# Patient Record
Sex: FEMALE | Race: WHITE | NOT HISPANIC OR LATINO | URBAN - METROPOLITAN AREA
[De-identification: names, ages, dates, MRNs, and addresses within clinical notes are randomized per-mention and may not be internally consistent; named-entity substitution may affect disease eponyms.]

---

## 2017-10-18 ENCOUNTER — APPOINTMENT (OUTPATIENT)
Dept: WOMENS IMAGING | Facility: HOSPITAL | Age: 47
End: 2017-10-18

## 2017-10-18 PROCEDURE — 77067 SCR MAMMO BI INCL CAD: CPT | Performed by: RADIOLOGY

## 2017-10-18 PROCEDURE — MDREVIEWSP: Performed by: RADIOLOGY

## 2017-10-18 PROCEDURE — 77063 BREAST TOMOSYNTHESIS BI: CPT | Performed by: RADIOLOGY

## 2017-10-18 PROCEDURE — G0202 SCR MAMMO BI INCL CAD: HCPCS | Performed by: RADIOLOGY

## 2019-02-13 ENCOUNTER — APPOINTMENT (OUTPATIENT)
Dept: WOMENS IMAGING | Facility: HOSPITAL | Age: 49
End: 2019-02-13

## 2019-02-13 PROCEDURE — MDREVIEWSP: Performed by: RADIOLOGY

## 2019-02-13 PROCEDURE — 77063 BREAST TOMOSYNTHESIS BI: CPT | Performed by: RADIOLOGY

## 2019-02-13 PROCEDURE — 77067 SCR MAMMO BI INCL CAD: CPT | Performed by: RADIOLOGY

## 2020-11-30 ENCOUNTER — APPOINTMENT (OUTPATIENT)
Dept: WOMENS IMAGING | Facility: HOSPITAL | Age: 50
End: 2020-11-30

## 2020-11-30 PROCEDURE — 77067 SCR MAMMO BI INCL CAD: CPT | Performed by: RADIOLOGY

## 2020-11-30 PROCEDURE — 77063 BREAST TOMOSYNTHESIS BI: CPT | Performed by: RADIOLOGY

## 2022-05-11 ENCOUNTER — APPOINTMENT (OUTPATIENT)
Dept: WOMENS IMAGING | Facility: HOSPITAL | Age: 52
End: 2022-05-11

## 2022-05-11 PROCEDURE — 77067 SCR MAMMO BI INCL CAD: CPT | Performed by: RADIOLOGY

## 2022-05-11 PROCEDURE — 77063 BREAST TOMOSYNTHESIS BI: CPT | Performed by: RADIOLOGY

## 2023-05-12 ENCOUNTER — APPOINTMENT (OUTPATIENT)
Dept: WOMENS IMAGING | Facility: HOSPITAL | Age: 53
End: 2023-05-12
Payer: COMMERCIAL

## 2023-05-12 PROCEDURE — 77067 SCR MAMMO BI INCL CAD: CPT | Performed by: RADIOLOGY

## 2023-05-12 PROCEDURE — 77063 BREAST TOMOSYNTHESIS BI: CPT | Performed by: RADIOLOGY

## 2023-10-19 ENCOUNTER — TELEPHONE (OUTPATIENT)
Dept: SURGERY | Facility: CLINIC | Age: 53
End: 2023-10-19
Payer: COMMERCIAL

## 2023-10-19 DIAGNOSIS — Z12.11 ENCOUNTER FOR SCREENING COLONOSCOPY: Primary | ICD-10-CM

## 2023-12-12 RX ORDER — ROSUVASTATIN CALCIUM 5 MG/1
5 TABLET, COATED ORAL EVERY OTHER DAY
COMMUNITY

## 2023-12-13 ENCOUNTER — ANESTHESIA (OUTPATIENT)
Dept: GASTROENTEROLOGY | Facility: HOSPITAL | Age: 53
End: 2023-12-13
Payer: COMMERCIAL

## 2023-12-13 ENCOUNTER — HOSPITAL ENCOUNTER (OUTPATIENT)
Facility: HOSPITAL | Age: 53
Setting detail: HOSPITAL OUTPATIENT SURGERY
Discharge: HOME | End: 2023-12-13
Attending: STUDENT IN AN ORGANIZED HEALTH CARE EDUCATION/TRAINING PROGRAM | Admitting: STUDENT IN AN ORGANIZED HEALTH CARE EDUCATION/TRAINING PROGRAM
Payer: COMMERCIAL

## 2023-12-13 ENCOUNTER — ANESTHESIA EVENT (OUTPATIENT)
Dept: GASTROENTEROLOGY | Facility: HOSPITAL | Age: 53
End: 2023-12-13
Payer: COMMERCIAL

## 2023-12-13 VITALS
RESPIRATION RATE: 16 BRPM | SYSTOLIC BLOOD PRESSURE: 139 MMHG | HEART RATE: 59 BPM | BODY MASS INDEX: 26.65 KG/M2 | WEIGHT: 156.1 LBS | OXYGEN SATURATION: 100 % | HEIGHT: 64 IN | DIASTOLIC BLOOD PRESSURE: 83 MMHG

## 2023-12-13 DIAGNOSIS — Z12.11 ENCOUNTER FOR SCREENING COLONOSCOPY: ICD-10-CM

## 2023-12-13 PROCEDURE — 25010000002 PROPOFOL 10 MG/ML EMULSION: Performed by: ANESTHESIOLOGY

## 2023-12-13 PROCEDURE — 25810000003 LACTATED RINGERS PER 1000 ML: Performed by: STUDENT IN AN ORGANIZED HEALTH CARE EDUCATION/TRAINING PROGRAM

## 2023-12-13 RX ORDER — PROPOFOL 10 MG/ML
VIAL (ML) INTRAVENOUS AS NEEDED
Status: DISCONTINUED | OUTPATIENT
Start: 2023-12-13 | End: 2023-12-13 | Stop reason: SURG

## 2023-12-13 RX ORDER — SODIUM CHLORIDE, SODIUM LACTATE, POTASSIUM CHLORIDE, CALCIUM CHLORIDE 600; 310; 30; 20 MG/100ML; MG/100ML; MG/100ML; MG/100ML
1000 INJECTION, SOLUTION INTRAVENOUS CONTINUOUS
Status: DISCONTINUED | OUTPATIENT
Start: 2023-12-13 | End: 2023-12-13 | Stop reason: HOSPADM

## 2023-12-13 RX ORDER — LIDOCAINE HYDROCHLORIDE 20 MG/ML
INJECTION, SOLUTION INFILTRATION; PERINEURAL AS NEEDED
Status: DISCONTINUED | OUTPATIENT
Start: 2023-12-13 | End: 2023-12-13 | Stop reason: SURG

## 2023-12-13 RX ORDER — SODIUM CHLORIDE 0.9 % (FLUSH) 0.9 %
10 SYRINGE (ML) INJECTION AS NEEDED
Status: DISCONTINUED | OUTPATIENT
Start: 2023-12-13 | End: 2023-12-13 | Stop reason: HOSPADM

## 2023-12-13 RX ORDER — LIDOCAINE HYDROCHLORIDE 10 MG/ML
0.5 INJECTION, SOLUTION INFILTRATION; PERINEURAL ONCE AS NEEDED
Status: DISCONTINUED | OUTPATIENT
Start: 2023-12-13 | End: 2023-12-13 | Stop reason: HOSPADM

## 2023-12-13 RX ADMIN — PROPOFOL 200 MCG/KG/MIN: 10 INJECTION, EMULSION INTRAVENOUS at 12:01

## 2023-12-13 RX ADMIN — LIDOCAINE HYDROCHLORIDE 60 MG: 20 INJECTION, SOLUTION INFILTRATION; PERINEURAL at 12:01

## 2023-12-13 RX ADMIN — PROPOFOL 20 MG: 10 INJECTION, EMULSION INTRAVENOUS at 12:27

## 2023-12-13 RX ADMIN — PROPOFOL 50 MG: 10 INJECTION, EMULSION INTRAVENOUS at 12:06

## 2023-12-13 RX ADMIN — SODIUM CHLORIDE, POTASSIUM CHLORIDE, SODIUM LACTATE AND CALCIUM CHLORIDE 1000 ML: 600; 310; 30; 20 INJECTION, SOLUTION INTRAVENOUS at 11:40

## 2023-12-13 RX ADMIN — PROPOFOL 150 MG: 10 INJECTION, EMULSION INTRAVENOUS at 12:01

## 2023-12-13 NOTE — DISCHARGE INSTRUCTIONS
For the next 24 hours patient needs to be with a responsible adult.    For 24 hours DO NOT drive, operate machinery, appliances, drink alcohol, make important decisions or sign legal documents.    Start with a light or bland diet if you are feeling sick to your stomach otherwise advance to regular diet as tolerated.    Follow recommendations on procedure report if provided by your doctor.    Call Dr Sebastian for problems [phone]    Problems may include but not limited to: large amounts of bleeding, trouble breathing, repeated vomiting, severe unrelieved pain, fever or chills.

## 2023-12-13 NOTE — ANESTHESIA POSTPROCEDURE EVALUATION
Patient: Rafia Baker    Procedure Summary       Date: 12/13/23 Room / Location:  [facility]    Anesthesia Start: 1156 Anesthesia Stop: 1234    Procedure: COLONOSCOPY to cecum into terminal ileum Diagnosis:       Encounter for screening colonoscopy      (Encounter for screening colonoscopy [Z12.11])    Surgeons: Krupa Sebastian MD Provider: Heri Scott MD    Anesthesia Type: MAC ASA Status: 1            Anesthesia Type: MAC    Vitals  Vitals Value Taken Time   /83 12/13/23 1300   Temp     Pulse 56 12/13/23 1304   Resp 16 12/13/23 1300   SpO2 97 % 12/13/23 1304   Vitals shown include unfiled device data.        Post Anesthesia Care and Evaluation    Patient location during evaluation: bedside  Pain management: adequate    Airway patency: patent  Anesthetic complications: No anesthetic complications    Cardiovascular status: acceptable  Respiratory status: acceptable  Hydration status: acceptable

## 2023-12-13 NOTE — H&P
GENERAL SURGERY HISTORY AND PHYSICAL     CHIEF COMPLAINT:    Screening colonoscopy    HPI:    Rafia Baker is a 53 y.o. lady here for screening colonoscopy. She has never had a colonoscopy before. She states she has done a cologuard test in the past that was negative. She denies any family history of colorectal cancer.     ALLERGIES:   Allergies   Allergen Reactions    Penicillins Swelling     AS A CHILD ONLY     MEDICATIONS:    Reviewed in Epic   Rosuvastatin    PHYSICAL EXAM:   Constitutional: Well-developed well-nourished, no acute distress  Eyes: Conjunctiva normal, sclera nonicteric  ENMT: Hearing grossly normal, oral mucosa moist  Neck: Supple, trachea midline  Respiratory: Normal inspiratory effort  Cardiovascular: Regular rate, no peripheral edema, no jugular venous distention  Gastrointestinal: Abd soft, nondistended  Skin:  Warm, dry, no rash on visualized skin surfaces  Musculoskeletal: Symmetric strength  Psychiatric: Alert and oriented ×3, normal affect   BMI is >= 25 and <30. (Overweight) The following options were offered after discussion;: information on healthy weight added to patient's after visit summary       ASSESSMENT/PLAN:  Rafia Baker is a 53 y.o. lady here for screening colonoscopy.     I discussed recommendation for colonoscopy.  I obtained informed consent, discussing with the patient the benefits, risks of the procedure (including but not limited to: unexpected bleeding possibly requiring hospitalization and/or an unplanned repeat colonoscopy, perforation possibly requiring surgical treatment, missed lesions, complications of sedation/anesthesia), alternatives, and postprocedure expectations.  I provided opportunity for the patient to ask questions and answered them to the best of my ability.  The patient wishes to proceed with colonoscopy.    rKupa Sebastian MD  General, Robotic and Endoscopic Surgery  [facility]    [address], [address]  [place], [place], [address]  P:  [phone]  F: [phone]

## 2023-12-13 NOTE — ANESTHESIA PREPROCEDURE EVALUATION
Anesthesia Evaluation     Patient summary reviewed and Nursing notes reviewed   history of anesthetic complications:  PONV prolonged sedation  NPO Solid Status: > 8 hours  NPO Liquid Status: > 4 hours           Airway   Mallampati: II  TM distance: >3 FB  Neck ROM: full  No difficulty expected  Dental - normal exam     Pulmonary - normal exam    breath sounds clear to auscultation  Cardiovascular - normal exam    Rhythm: regular  Rate: normal    (+) hyperlipidemia      Neuro/Psych  GI/Hepatic/Renal/Endo      Musculoskeletal     Abdominal  - normal exam   Substance History      OB/GYN          Other                      Anesthesia Plan    ASA 1     MAC     intravenous induction     Anesthetic plan, risks, benefits, and alternatives have been provided, discussed and informed consent has been obtained with: patient.      CODE STATUS:

## 2024-05-10 ENCOUNTER — OFFICE VISIT (OUTPATIENT)
Dept: INTERNAL MEDICINE | Facility: CLINIC | Age: 54
End: 2024-05-10
Payer: COMMERCIAL

## 2024-05-10 VITALS
OXYGEN SATURATION: 97 % | DIASTOLIC BLOOD PRESSURE: 84 MMHG | HEIGHT: 64 IN | TEMPERATURE: 97.7 F | WEIGHT: 148 LBS | BODY MASS INDEX: 25.27 KG/M2 | SYSTOLIC BLOOD PRESSURE: 124 MMHG | HEART RATE: 70 BPM

## 2024-05-10 DIAGNOSIS — M54.12 CERVICAL RADICULOPATHY: ICD-10-CM

## 2024-05-10 DIAGNOSIS — Z82.49 FAMILY HISTORY OF EARLY CAD: ICD-10-CM

## 2024-05-10 DIAGNOSIS — E78.5 HYPERLIPIDEMIA, UNSPECIFIED HYPERLIPIDEMIA TYPE: Primary | ICD-10-CM

## 2024-05-10 PROBLEM — I25.10 ATHEROSCLEROSIS OF CORONARY ARTERY WITHOUT ANGINA PECTORIS: Status: ACTIVE | Noted: 2023-12-26

## 2024-05-10 PROCEDURE — 99204 OFFICE O/P NEW MOD 45 MIN: CPT | Performed by: STUDENT IN AN ORGANIZED HEALTH CARE EDUCATION/TRAINING PROGRAM

## 2024-05-10 RX ORDER — CHOLECALCIFEROL (VITAMIN D3) 125 MCG
5 CAPSULE ORAL
COMMUNITY

## 2024-05-10 RX ORDER — ERGOCALCIFEROL 1.25 MG/1
50000 CAPSULE ORAL WEEKLY
COMMUNITY

## 2024-05-10 RX ORDER — MAGNESIUM GLUCONATE 27 MG(500)
27 TABLET ORAL 2 TIMES DAILY
COMMUNITY

## 2024-05-14 ENCOUNTER — APPOINTMENT (OUTPATIENT)
Dept: WOMENS IMAGING | Facility: HOSPITAL | Age: 54
End: 2024-05-14
Payer: COMMERCIAL

## 2024-05-14 PROCEDURE — 77063 BREAST TOMOSYNTHESIS BI: CPT | Performed by: RADIOLOGY

## 2024-05-14 PROCEDURE — 77067 SCR MAMMO BI INCL CAD: CPT | Performed by: RADIOLOGY

## 2024-09-03 ENCOUNTER — OFFICE VISIT (OUTPATIENT)
Dept: INTERNAL MEDICINE | Facility: CLINIC | Age: 54
End: 2024-09-03
Payer: COMMERCIAL

## 2024-09-03 VITALS
OXYGEN SATURATION: 98 % | DIASTOLIC BLOOD PRESSURE: 68 MMHG | WEIGHT: 146.8 LBS | SYSTOLIC BLOOD PRESSURE: 124 MMHG | BODY MASS INDEX: 25.06 KG/M2 | TEMPERATURE: 97.6 F | HEIGHT: 64 IN | HEART RATE: 67 BPM

## 2024-09-03 DIAGNOSIS — Z82.49 FAMILY HISTORY OF EARLY CAD: ICD-10-CM

## 2024-09-03 DIAGNOSIS — E78.5 HYPERLIPIDEMIA, UNSPECIFIED HYPERLIPIDEMIA TYPE: Primary | ICD-10-CM

## 2024-09-03 DIAGNOSIS — M72.2 PLANTAR FASCIITIS: ICD-10-CM

## 2024-09-03 DIAGNOSIS — R03.0 ELEVATED BLOOD PRESSURE READING WITHOUT DIAGNOSIS OF HYPERTENSION: ICD-10-CM

## 2024-09-03 DIAGNOSIS — I25.10 ATHEROSCLEROSIS OF NATIVE CORONARY ARTERY OF NATIVE HEART WITHOUT ANGINA PECTORIS: ICD-10-CM

## 2024-09-03 PROCEDURE — 99214 OFFICE O/P EST MOD 30 MIN: CPT | Performed by: STUDENT IN AN ORGANIZED HEALTH CARE EDUCATION/TRAINING PROGRAM

## 2024-09-03 RX ORDER — POLYMYXIN B SULFATE AND TRIMETHOPRIM 1; 10000 MG/ML; [USP'U]/ML
1 SOLUTION OPHTHALMIC EVERY 4 HOURS
COMMUNITY
Start: 2024-08-13 | End: 2024-09-05 | Stop reason: ALTCHOICE

## 2024-09-03 NOTE — PROGRESS NOTES
"Chief Complaint  Hyperlipidemia    Subjective        Rafia Baker presents to [facility]  History of Present Illness    Presents to clinic today for follow up    Labs obtained prior to this visit demonstrated non-HDL cholesterol is largely unchanged from February lipid panel.  She is currently taking Crestor and has researched other pharmacological options for her hyperlipidemia.  She does express concern regarding her biomarkers of ApoB and LPA and risk factors for cardiovascular disease.  She also reports of some pain in her left plantar heel that she attributes to her statin as symptom onset was initiation of statin    Objective   Vital Signs:  /68   Pulse 67   Temp 97.6 °F (36.4 °C) (Temporal)   Ht 162.6 cm (64\")   Wt 66.6 kg (146 lb 12.8 oz)   SpO2 98%   BMI 25.20 kg/m²   Estimated body mass index is 25.2 kg/m² as calculated from the following:    Height as of this encounter: 162.6 cm (64\").    Weight as of this encounter: 66.6 kg (146 lb 12.8 oz).            Physical Exam  Vitals reviewed.   Constitutional:       General: She is not in acute distress.     Appearance: Normal appearance. She is not toxic-appearing.   HENT:      Head: Normocephalic and atraumatic.   Eyes:      General: No scleral icterus.     Conjunctiva/sclera: Conjunctivae normal.   Musculoskeletal:      Left foot: Tenderness present.      Comments: Tenderness at left plantar insertion of left heel   Skin:     General: Skin is warm and dry.   Neurological:      Mental Status: She is alert and oriented to person, place, and time.   Psychiatric:         Mood and Affect: Mood normal.         Behavior: Behavior normal.        Result Review :                Assessment and Plan   Diagnoses and all orders for this visit:    1. Hyperlipidemia, unspecified hyperlipidemia type (Primary)    2. Plantar fasciitis  -     Ambulatory Referral to Podiatry    3. Family history of early CAD    4. Atherosclerosis of " native coronary artery of native heart without angina pectoris    5. Elevated blood pressure reading without diagnosis of hypertension      HLD is chronic, and largely unchanged from Feb 2024 lipid panel despite crestor. She is extremely concerned regarding her ASCVD risk given her apoB and Lp(a) biomarkers however I encouraged her that these do not necessarily change our management of HLD and lifestyle modifications in addition to her current medication is best way to prevent potential future ASCVD events. She has upcoming appt with cardiology to discuss further, so we will hold on adjusting or changing any medications however I did inform her that I do not think her plantar fasciitis is related at all and that this was likely coincidental in onset. I will refer her to podiatry and provided information regarding stretching of plantar fasciitis.     Her initial BP reading was at goal however repeat was slightly elevated at 144/88. She will come back in about 1mo to recheck this and in meantime she will get BP cuff at home to monitor.            I spent 31 minutes caring for Rafia on this date of service. This time includes time spent by me in the following activities:preparing for the visit, reviewing tests, obtaining and/or reviewing a separately obtained history, performing a medically appropriate examination and/or evaluation , ordering medications, tests, or procedures, referring and communicating with other health care professionals , and documenting information in the medical record  Follow Up   Return in about 3 months (around 12/3/2024) for Annual physical.  Patient was given instructions and counseling regarding her condition or for health maintenance advice. Please see specific information pulled into the AVS if appropriate.

## 2024-09-05 ENCOUNTER — OFFICE VISIT (OUTPATIENT)
Dept: CARDIOLOGY | Facility: CLINIC | Age: 54
End: 2024-09-05
Payer: COMMERCIAL

## 2024-09-05 VITALS
BODY MASS INDEX: 25.27 KG/M2 | HEIGHT: 64 IN | DIASTOLIC BLOOD PRESSURE: 88 MMHG | HEART RATE: 66 BPM | WEIGHT: 148 LBS | SYSTOLIC BLOOD PRESSURE: 148 MMHG

## 2024-09-05 DIAGNOSIS — E78.41 ELEVATED LIPOPROTEIN(A): Primary | ICD-10-CM

## 2024-09-05 DIAGNOSIS — Z82.49 FAMILY HISTORY OF EARLY CAD: ICD-10-CM

## 2024-09-05 PROCEDURE — 93000 ELECTROCARDIOGRAM COMPLETE: CPT | Performed by: INTERNAL MEDICINE

## 2024-09-05 PROCEDURE — 99204 OFFICE O/P NEW MOD 45 MIN: CPT | Performed by: INTERNAL MEDICINE

## 2024-09-05 RX ORDER — ROSUVASTATIN CALCIUM 10 MG/1
10 TABLET, COATED ORAL NIGHTLY
Qty: 90 TABLET | Refills: 3 | Status: SHIPPED | COMMUNITY
Start: 2024-09-05

## 2024-09-05 NOTE — PROGRESS NOTES
Date of Office Visit: 2024  Encounter Provider: Stephani Fountain MD  Place of Service: [facility] CARDIOLOGY  Patient Name: Rafia Baker  :1970      Patient ID:  Rafia Baker is a 54 y.o. female is here for hyperlipidemia, mild CAD.          History of Present Illness    She has a history of mild CAD, chronic venous insufficiency and hyperlipidemia.    Her mom  at 63 of myocardial infarction.  Her dad had hypertension and  in his sleep at age 80.  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  She is from [facility].    Stress echocardiogram done 2019 was nonischemic with normal left ventricular systolic function no significant valvular abnormalities.  Labs done 2024 showed total cholesterol 191, triglycerides 92, HDL 64, VLDL 17, , normal CMP.    Labs on 2024 showed LP(a) elevated at 332.7, , HDL 66, total cholesterol 192, triglycerides 109, apolipoprotein B 100, VLDL 19.  CTA angiogram heart done 2023 at [facility] showed a total calcium score 3.2 in the LAD, 30% mid LAD stenosis, 20% proximal mLAD stenosis, 30% mid circumflex stenosis, mild nonobstructive coronary disease-medical management recommended.    She has plantars fasciitis and so is not exercising right now.  She has switched to mostly a vegan diet but does use some protein.  She has had some mild muscle aching but she is not sure if it is at all related to rosuvastatin.  She has no heart racing or skipping.  She has had no dizziness, syncope or falls.    Past Medical History:   Diagnosis Date    Anesthesia     STATES SLOW TO WAKE UP AFTER SURGERY    Hyperlipidemia     PONV (postoperative nausea and vomiting)          Past Surgical History:   Procedure Laterality Date    COLONOSCOPY N/A 2023    Procedure: COLONOSCOPY to cecum  "into terminal ileum;  Surgeon: Krupa Sebastian MD;  Location: [facility] ENDOSCOPY;  Service: General;  Laterality: N/A;  pre: screening  post: normal    ENDOVENOUS ABLATION SAPHENOUS VEIN W/ LASER      HYSTEROSCOPY W/ POLYPECTOMY      OTHER SURGICAL HISTORY      IVF       Current Outpatient Medications on File Prior to Visit   Medication Sig Dispense Refill    Ascorbic Acid (VITAMIN C PO) Take  by mouth.      magnesium gluconate (MAGONATE) 500 MG tablet Take 27 mg by mouth Daily.      rosuvastatin (CRESTOR) 5 MG tablet Take 1 tablet by mouth Daily.      vitamin D (ERGOCALCIFEROL) 1.25 MG (33795 UT) capsule capsule Take 1 capsule by mouth 1 (One) Time Per Week.      trimethoprim-polymyxin b (POLYTRIM) 59228-4.1 UNIT/ML-% ophthalmic solution Apply 1 drop to eye(s) as directed by provider Every 4 (Four) Hours. (Patient not taking: Reported on 9/5/2024)       No current facility-administered medications on file prior to visit.       Social History     Socioeconomic History    Marital status:     Number of children: 1   Tobacco Use    Smoking status: Never     Passive exposure: Never    Smokeless tobacco: Never    Tobacco comments:     Caffeine: 2 coffee daily   Vaping Use    Vaping status: Never Used   Substance and Sexual Activity    Alcohol use: Not Currently     Comment: 1-2 times yearly    Drug use: Never    Sexual activity: Defer             Procedures    ECG 12 Lead    Date/Time: 9/5/2024 11:46 AM  Performed by: Stephani Fountain MD    Authorized by: Stephani Fountain MD  Comparison: not compared with previous ECG   Previous ECG: no previous ECG available  Rhythm: sinus rhythm  Other findings: non-specific ST-T wave changes    Clinical impression: non-specific ECG              Objective:      Vitals:    09/05/24 1129   BP: 148/88   Pulse: 66   Weight: 67.1 kg (148 lb)   Height: 162.6 cm (64\")     Body mass index is 25.4 kg/m².    Vitals reviewed.   Constitutional:       General: Not in acute " distress.     Appearance: Not diaphoretic.   Neck:      Vascular: No carotid bruit or JVD.   Pulmonary:      Effort: Pulmonary effort is normal.      Breath sounds: Normal breath sounds.   Cardiovascular:      Normal rate. Regular rhythm.      Murmurs: There is no murmur.      No gallop.  No rub.   Pulses:     Intact distal pulses.      Carotid: 2+ bilaterally.     Radial: 2+ bilaterally.     Dorsalis pedis: 2+ bilaterally.     Posterior tibial: 2+ bilaterally.  Edema:     Peripheral edema absent.   Neurological:      Cranial Nerves: No cranial nerve deficit.         Lab Review:       Assessment:      Diagnosis Plan   1. Elevated lipoprotein(a)  Lipid Panel      2. Family history of early CAD  Lipid Panel        Elevated LP(a) and apolipoprotein B.  Increases to statin 10 mg nightly, watch for muscle aching.  She will let me know if this is not tolerable.  Mild nonobstructive CAD, no angina  Family history of CAD  Elevated blood pressure reading here today, at home her blood pressure runs 115-120/70     Plan:       Increase rosuvastatin 10 mg daily.  Consider PCSK9 if she cannot tolerate the rosuvastatin.  Advised exercise, hydration and healthy eating, see Terra in 3 months to see how she is tolerating the medication.  Repeat lipids in 6 weeks

## 2024-09-24 ENCOUNTER — TELEPHONE (OUTPATIENT)
Dept: CARDIOLOGY | Facility: CLINIC | Age: 54
End: 2024-09-24
Payer: COMMERCIAL

## 2024-11-04 RX ORDER — ROSUVASTATIN CALCIUM 5 MG/1
5 TABLET, COATED ORAL DAILY
Qty: 90 TABLET | Refills: 3 | Status: SHIPPED | OUTPATIENT
Start: 2024-11-04

## 2024-11-04 NOTE — TELEPHONE ENCOUNTER
Failed protocol    NOV-12/11/24-RM  LOV-09/05/24-RM    Plan:       Increase rosuvastatin 10 mg daily.  Consider PCSK9 if she cannot tolerate the rosuvastatin.  Advised exercise, hydration and healthy eating, see [facility] in 3 months to see how she is tolerating the medication.  Repeat lipids in 6 weeks     Addendum:  I reviewed ECG from [facility] done 12/2021 which shows very slight ST depression, unchanged from her prior ECG

## 2024-12-04 ENCOUNTER — TELEPHONE (OUTPATIENT)
Dept: INTERNAL MEDICINE | Facility: CLINIC | Age: 54
End: 2024-12-04
Payer: COMMERCIAL

## 2024-12-04 DIAGNOSIS — E78.41 ELEVATED LIPOPROTEIN(A): Primary | ICD-10-CM

## 2024-12-04 NOTE — TELEPHONE ENCOUNTER
Caller: Rafia Baker    Relationship: Self    Best call back number: [phone]     What orders are you requesting (i.e. lab or imaging): BLOOD WORK; SAME AS LABS DONE IN AUGUST 2024    In what timeframe would the patient need to come in: ASAP    Where will you receive your lab/imaging services: OFFICE    Additional notes: PATIENT CALLED STATING THAT SHE WOULD LIKE TO HAVE THESE LABS REPEATED. SHE WOULD LIKE TO COME IN ON 12/9/24 THAT WAY THE RESULTS WILL BE READY FOR HER CARDIOLOGIST ON 12/11/24 AND DR. ANGEL ON 12/13/24.    PLEASE CALL PATIENT TO SCHEDULE

## 2024-12-09 ENCOUNTER — TELEPHONE (OUTPATIENT)
Dept: CARDIOLOGY | Facility: CLINIC | Age: 54
End: 2024-12-09

## 2024-12-09 ENCOUNTER — LAB (OUTPATIENT)
Dept: LAB | Facility: HOSPITAL | Age: 54
End: 2024-12-09
Payer: COMMERCIAL

## 2024-12-09 DIAGNOSIS — E78.41 ELEVATED LIPOPROTEIN(A): ICD-10-CM

## 2024-12-09 DIAGNOSIS — E78.5 HYPERLIPIDEMIA, UNSPECIFIED HYPERLIPIDEMIA TYPE: ICD-10-CM

## 2024-12-09 DIAGNOSIS — Z82.49 FAMILY HISTORY OF EARLY CAD: ICD-10-CM

## 2024-12-09 DIAGNOSIS — E78.5 HYPERLIPIDEMIA, UNSPECIFIED HYPERLIPIDEMIA TYPE: Primary | ICD-10-CM

## 2024-12-09 LAB
CHOLEST SERPL-MCNC: 170 MG/DL (ref 0–200)
HDLC SERPL-MCNC: 67 MG/DL (ref 40–60)
LDLC SERPL CALC-MCNC: 92 MG/DL (ref 0–100)
LDLC/HDLC SERPL: 1.37 {RATIO}
TRIGL SERPL-MCNC: 55 MG/DL (ref 0–150)
VLDLC SERPL-MCNC: 11 MG/DL (ref 5–40)

## 2024-12-09 PROCEDURE — 82172 ASSAY OF APOLIPOPROTEIN: CPT

## 2024-12-09 PROCEDURE — 36415 COLL VENOUS BLD VENIPUNCTURE: CPT

## 2024-12-09 PROCEDURE — 80061 LIPID PANEL: CPT

## 2024-12-09 PROCEDURE — 83695 ASSAY OF LIPOPROTEIN(A): CPT

## 2024-12-10 LAB — LPA SERPL-SCNC: 286.2 NMOL/L

## 2024-12-11 ENCOUNTER — OFFICE VISIT (OUTPATIENT)
Dept: CARDIOLOGY | Facility: CLINIC | Age: 54
End: 2024-12-11
Payer: COMMERCIAL

## 2024-12-11 VITALS
HEIGHT: 64 IN | HEART RATE: 70 BPM | DIASTOLIC BLOOD PRESSURE: 70 MMHG | SYSTOLIC BLOOD PRESSURE: 140 MMHG | WEIGHT: 149 LBS | BODY MASS INDEX: 25.44 KG/M2

## 2024-12-11 DIAGNOSIS — R94.31 ABNORMAL ECG: ICD-10-CM

## 2024-12-11 DIAGNOSIS — Z82.49 FAMILY HISTORY OF EARLY CAD: ICD-10-CM

## 2024-12-11 DIAGNOSIS — E78.41 ELEVATED LIPOPROTEIN(A): Primary | ICD-10-CM

## 2024-12-11 PROCEDURE — 93000 ELECTROCARDIOGRAM COMPLETE: CPT | Performed by: INTERNAL MEDICINE

## 2024-12-11 PROCEDURE — 99214 OFFICE O/P EST MOD 30 MIN: CPT | Performed by: INTERNAL MEDICINE

## 2024-12-11 RX ORDER — ROSUVASTATIN CALCIUM 10 MG/1
10 TABLET, COATED ORAL NIGHTLY
Qty: 90 TABLET | Refills: 3 | Status: SHIPPED | OUTPATIENT
Start: 2024-12-11 | End: 2024-12-11 | Stop reason: SDUPTHER

## 2024-12-11 RX ORDER — ROSUVASTATIN CALCIUM 10 MG/1
10 TABLET, COATED ORAL NIGHTLY
Qty: 90 TABLET | Refills: 3 | Status: SHIPPED | OUTPATIENT
Start: 2024-12-11

## 2024-12-11 RX ORDER — EZETIMIBE 10 MG/1
10 TABLET ORAL DAILY
Qty: 90 TABLET | Refills: 3 | Status: SHIPPED | OUTPATIENT
Start: 2024-12-11

## 2024-12-11 NOTE — PROGRESS NOTES
Date of Office Visit: 2024  Encounter Provider: Stephani Fountain MD  Place of Service: [facility] CARDIOLOGY  Patient Name: Rafia Baker  :1970      Patient ID:  Rafia Baker is a 54 y.o. female is here for hyperlipidemia, mild CAD.          History of Present Illness    She has a history of mild CAD, chronic venous insufficiency and hyperlipidemia.    Her mom  at 63 of myocardial infarction.  Her dad had hypertension and  in his sleep at age 80.  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  She is from [facility].    Stress echocardiogram done 2019 was nonischemic with normal left ventricular systolic function no significant valvular abnormalities.      Labs on 2024 showed LP(a) elevated at 332.7, , HDL 66, total cholesterol 192, triglycerides 109, apolipoprotein B 100, VLDL 19.  CTA angiogram heart done 2023 at [facility] showed a total calcium score 3.2 in the LAD, 30% mid LAD stenosis, 20% proximal mLAD stenosis, 30% mid circumflex stenosis, mild nonobstructive coronary disease-medical management recommended.    Labs on 2024 showed LP(a) 286, HDL 67, triglycerides 55, LDL 92, VLDL 11.  She has no exertional chest pain.  She has occasional palpitations.  She has no dizziness or syncope.  She has no heart racing.  She has no orthopnea or PND.  She has no exertional dyspnea.  She is trying to exercise and feels well when she is doing this.    Past Medical History:   Diagnosis Date    Anesthesia     STATES SLOW TO WAKE UP AFTER SURGERY    Hyperlipidemia     PONV (postoperative nausea and vomiting)          Past Surgical History:   Procedure Laterality Date    COLONOSCOPY N/A 2023    Procedure: COLONOSCOPY to cecum into terminal ileum;  Surgeon: Krupa Sebastian MD;  Location: [facility] ENDOSCOPY;  " Service: General;  Laterality: N/A;  pre: screening  post: normal    ENDOVENOUS ABLATION SAPHENOUS VEIN W/ LASER      HYSTEROSCOPY W/ POLYPECTOMY      OTHER SURGICAL HISTORY      IVF       Current Outpatient Medications on File Prior to Visit   Medication Sig Dispense Refill    Ascorbic Acid (VITAMIN C PO) Take  by mouth.      magnesium gluconate (MAGONATE) 500 MG tablet Take 27 mg by mouth Daily.      vitamin D (ERGOCALCIFEROL) 1.25 MG (71895 UT) capsule capsule Take 1 capsule by mouth 1 (One) Time Per Week.      [DISCONTINUED] rosuvastatin (CRESTOR) 5 MG tablet TAKE 1 TABLET BY MOUTH EVERY DAY (Patient taking differently: Take 2 tablets by mouth Daily.) 90 tablet 3     No current facility-administered medications on file prior to visit.       Social History     Socioeconomic History    Marital status:     Number of children: 1   Tobacco Use    Smoking status: Never     Passive exposure: Never    Smokeless tobacco: Never    Tobacco comments:     Caffeine: 2 coffee daily   Vaping Use    Vaping status: Never Used   Substance and Sexual Activity    Alcohol use: Not Currently     Comment: 1-2 times yearly    Drug use: Never    Sexual activity: Defer             Procedures    ECG 12 Lead    Date/Time: 12/11/2024 10:52 AM  Performed by: Stephani Fountain MD    Authorized by: Stephani Fountain MD  Comparison: compared with previous ECG   Similar to previous ECG  Rhythm: sinus rhythm  Other findings: non-specific ST-T wave changes    Clinical impression: non-specific ECG              Objective:      Vitals:    12/11/24 1022   BP: 140/70   Pulse: 70   Weight: 67.6 kg (149 lb)   Height: 162.6 cm (64.02\")     Body mass index is 25.56 kg/m².    Vitals reviewed.   Constitutional:       General: Not in acute distress.     Appearance: Not diaphoretic.   Neck:      Vascular: No carotid bruit or JVD.   Pulmonary:      Effort: Pulmonary effort is normal.      Breath sounds: Normal breath sounds.   Cardiovascular:     "  Normal rate. Regular rhythm.      Murmurs: There is no murmur.      No gallop.  No rub.   Pulses:     Intact distal pulses.      Carotid: 2+ bilaterally.     Radial: 2+ bilaterally.     Dorsalis pedis: 2+ bilaterally.     Posterior tibial: 2+ bilaterally.  Edema:     Peripheral edema absent.   Neurological:      Cranial Nerves: No cranial nerve deficit.         Lab Review:       Assessment:      Diagnosis Plan   1. Elevated lipoprotein(a)  Apolipoprotein B    Lipid Panel      2. Family history of early CAD  Apolipoprotein B    Lipid Panel      3. Abnormal ECG          Elevated LP(a) and apolipoprotein B.  Remain on rosuvastatin 10 mg nightly.  Add Zetia.  Mild nonobstructive CAD, no angina  Family history of CAD  Mildly abnormal ECG, chronic.     Plan:       Remain on rosuvastatin 10 mg daily and add Zetia 10 mg daily.  Her blood pressure at home is normal-she checks it and it runs 120s over 70s.  No other testing at this time.  See me back in May.  Repeat April B and lipid panel March 2024.

## 2024-12-12 ENCOUNTER — TELEPHONE (OUTPATIENT)
Dept: CARDIOLOGY | Facility: CLINIC | Age: 54
End: 2024-12-12
Payer: COMMERCIAL

## 2024-12-12 LAB — APO B SERPL-MCNC: 87 MG/DL

## 2024-12-12 NOTE — TELEPHONE ENCOUNTER
I tried to call Rafia Baker but there was no answer.  Left a voicemail asking patient to call back.  Will continue to try to reach pt.    HUB- if pt calls back, please transfer through to triage.    Thank you,    Sahara VIEYRA, RN  Triage [facility]  12/12/24 08:20 EST

## 2024-12-12 NOTE — LETTER
Rafia Baker  [address] [facility] [address]          12/16/24      Rafia Baker    The nurses at [facility] are trying to reach you to go over your recent testing.  Dr. Fountain wanted you to know that we did check an apolipoprotein B level on 12/9/24.  The test result came back late.  The level is down to 87 now, where it was 100 before.  She would like for you to still start Zetia (10 mg daily).      If you have any questions please call us at [phone] at your earliest convenience, and ask to speak with the triage department.      Thank you,    [facility] Triage Department

## 2024-12-12 NOTE — TELEPHONE ENCOUNTER
Please call and let her know that we did get an apolipoprotein B level done 12/9/2024-it was late and resulting.  The level is now 87 down from 100.  Would still start Zetia.

## 2024-12-13 ENCOUNTER — OFFICE VISIT (OUTPATIENT)
Dept: INTERNAL MEDICINE | Facility: CLINIC | Age: 54
End: 2024-12-13
Payer: COMMERCIAL

## 2024-12-13 VITALS
BODY MASS INDEX: 25.37 KG/M2 | DIASTOLIC BLOOD PRESSURE: 78 MMHG | HEIGHT: 64 IN | OXYGEN SATURATION: 94 % | HEART RATE: 87 BPM | SYSTOLIC BLOOD PRESSURE: 124 MMHG | WEIGHT: 148.6 LBS

## 2024-12-13 DIAGNOSIS — Z00.00 ANNUAL PHYSICAL EXAM: Primary | ICD-10-CM

## 2024-12-13 DIAGNOSIS — J30.9 ALLERGIC RHINITIS, UNSPECIFIED SEASONALITY, UNSPECIFIED TRIGGER: ICD-10-CM

## 2024-12-13 PROCEDURE — 99396 PREV VISIT EST AGE 40-64: CPT | Performed by: STUDENT IN AN ORGANIZED HEALTH CARE EDUCATION/TRAINING PROGRAM

## 2024-12-13 RX ORDER — OLOPATADINE HYDROCHLORIDE 2 MG/ML
1 SOLUTION/ DROPS OPHTHALMIC DAILY
Qty: 2.5 ML | Refills: 0 | Status: SHIPPED | OUTPATIENT
Start: 2024-12-13

## 2024-12-13 RX ORDER — FLUTICASONE PROPIONATE 50 MCG
1 SPRAY, SUSPENSION (ML) NASAL DAILY
Qty: 16 G | Refills: 0 | Status: SHIPPED | OUTPATIENT
Start: 2024-12-13

## 2024-12-13 NOTE — TELEPHONE ENCOUNTER
I tried to call Rafia Baker but there was no answer.  Left a voicemail asking patient to call back.  Will continue to try to reach pt.    HUB- if pt calls back, please transfer through to triage.    Thank you,    Sahara VIEYRA, RN  Triage [facility]  12/13/24 08:51 EST

## 2024-12-15 NOTE — PROGRESS NOTES
"Chief Complaint  Annual Exam    Subjective        Rafia Baker presents to [facility]  History of Present Illness    Presents to clinic today for annual physical    She is doing well since last visit, has no acute complaints or concerns other than some sinus congestion and rhinitis.    She has since seen cardiology who initiated Zetia in addition to rosuvastatin.  She is tolerating both well    Review of Systems   Constitutional:  Negative for appetite change, fever and unexpected weight change.   HENT:  Positive for congestion and rhinorrhea. Negative for postnasal drip.    Eyes:  Negative for photophobia and visual disturbance.   Respiratory:  Negative for chest tightness and shortness of breath.    Cardiovascular:  Negative for chest pain and palpitations.   Gastrointestinal:  Negative for abdominal pain, blood in stool, diarrhea and vomiting.   Endocrine: Negative for polydipsia and polyuria.   Genitourinary:  Negative for dysuria, flank pain and hematuria.   Musculoskeletal:  Negative for arthralgias and gait problem.   Skin:  Negative for rash and wound.   Neurological:  Negative for seizures, syncope, weakness and headaches.   Psychiatric/Behavioral:  Negative for sleep disturbance and suicidal ideas. The patient is not nervous/anxious.          Objective   Vital Signs:  /78   Pulse 87   Ht 162.6 cm (64.02\")   Wt 67.4 kg (148 lb 9.6 oz)   SpO2 94%   BMI 25.49 kg/m²   Estimated body mass index is 25.49 kg/m² as calculated from the following:    Height as of this encounter: 162.6 cm (64.02\").    Weight as of this encounter: 67.4 kg (148 lb 9.6 oz).            Physical Exam   Result Review :                Assessment and Plan   Diagnoses and all orders for this visit:    1. Annual physical exam (Primary)    2. Allergic rhinitis, unspecified seasonality, unspecified trigger  -     olopatadine (PATADAY) 0.2 % solution ophthalmic solution; Administer 1 drop to both eyes " Daily.  Dispense: 2.5 mL; Refill: 0  -     fluticasone (FLONASE) 50 MCG/ACT nasal spray; Administer 1 spray into the nostril(s) as directed by provider Daily.  Dispense: 16 g; Refill: 0      Immunizations: recommend Tdap, shingrix, flu and COVID. She is feeling ill due to viral URI so declined today   Cancer screening: UTD, follow with OBGYN and had mammogram this year.   Metabolic Labs: UTD  Recommend maintaining a healthy lifestyle, rich in whole grains, fruits and vegetables. Limit high saturated fats and processed sugars. Maintain an active lifestyle with goal of 150 min of moderate aerobic exercise per week      RTC in 6mo or sooner prn          Follow Up   Return in about 6 months (around 6/13/2025).  Patient was given instructions and counseling regarding her condition or for health maintenance advice. Please see specific information pulled into the AVS if appropriate.

## 2024-12-16 NOTE — TELEPHONE ENCOUNTER
Group has tried to contact Rafia Baker multiple times with no success.  A letter requesting patient to call [facility] has been sent.    Thank you,    Sahara VIEYRA RN  Triage [facility]  12/16/24 09:59 EST

## 2024-12-16 NOTE — TELEPHONE ENCOUNTER
I tried to call Rafia Baker but there was no answer.  Left a voicemail asking patient to call back.      HUB- if pt calls back, please transfer through to triage.    Thank you,    Sahara VIEYRA, RN  Triage [facility] [place]  12/16/24 09:19 EST

## 2025-04-21 NOTE — PROGRESS NOTES
Chief Complaint  Varicose Veins    Subjective        Rafia Baker presents to [facility] VASCULAR SURGERY  HPI   Rafia Baker is a 54 y.o. female that has been self referred to our office for varicose veins. October 10, 2023 she underwent endovenous laser treatment of the left greater saphenous vein with Dr. Rosenthal. Symptoms began approximately 6 months ago and have gradually worsened.  Reports mild swelling and throbbing in the left leg which is worse with prolonged sitting. Throbbing sensation is at her medial calf.  She also points out an indentation area at her mid medial calf.  States that this area wasn't always there and she just noticed it when her legs started paining her.  She has no warmth or redness noted. Denies recent trauma.  Denies history of DVTs.  No family history of any DVTs.   Alleviating factors include standing. She has not tried using her compression hoses. Patient describes the discomfort from the veins as affecting her daily life/work.        Rafia Baker  reports that she has never smoked. She has never been exposed to tobacco smoke. She has never used smokeless tobacco..        Objective   Vital Signs:  Vitals:    04/25/25 1052   BP: 130/70      Body mass index is 26.66 kg/m².           Physical Exam  Constitutional:       Appearance: Normal appearance.   HENT:      Head: Normocephalic.   Eyes:      Pupils: Pupils are equal, round, and reactive to light.   Cardiovascular:      Rate and Rhythm: Normal rate and regular rhythm.      Pulses:           Dorsalis pedis pulses are 2+ on the right side and 2+ on the left side.        Posterior tibial pulses are 2+ on the right side and 2+ on the left side.      Comments: Left leg: visible varicose veins over posterior lateral knee and mid medial calf.      Pulmonary:      Effort: Pulmonary effort is normal.      Breath sounds: Normal breath sounds.   Musculoskeletal:         General: Normal range of motion.   Skin:     Capillary  Refill: Capillary refill takes less than 2 seconds.   Neurological:      Mental Status: She is alert.   Psychiatric:         Mood and Affect: Mood normal.         Behavior: Behavior normal.          Result Review :      CEAP Classification LLE:                    Assessment and Plan     Diagnoses and all orders for this visit:    1. Symptomatic varicose veins, bilateral (Primary)  -     Venous w Reflux Lower Extremity - Unilateral CAR; Future    2. Venous (peripheral) insufficiency            We have discussed the natural history and pathophysiology of venous insufficiency.   I have ordered LEFT leg vein mapping and reflux study to evaluate for superficial and deep system reflux.  The physician will then discuss the results and the treatment options.  In the meantime, it she is to continue conservative management which consist of compression hoses 20 to 30 mmHg, leg elevation, exercise, and NSAIDs as needed for pain.     Follow Up     No follow-ups on file.  Patient was given instructions and counseling regarding her condition or for health maintenance advice. Please see specific information pulled into the AVS if appropriate.     JOLEEN Lujan

## 2025-04-25 ENCOUNTER — OFFICE VISIT (OUTPATIENT)
Age: 55
End: 2025-04-25
Payer: COMMERCIAL

## 2025-04-25 VITALS
DIASTOLIC BLOOD PRESSURE: 70 MMHG | BODY MASS INDEX: 26.53 KG/M2 | HEIGHT: 64 IN | WEIGHT: 155.4 LBS | SYSTOLIC BLOOD PRESSURE: 130 MMHG

## 2025-04-25 DIAGNOSIS — I83.893 SYMPTOMATIC VARICOSE VEINS, BILATERAL: Primary | ICD-10-CM

## 2025-04-25 DIAGNOSIS — I87.2 VENOUS (PERIPHERAL) INSUFFICIENCY: ICD-10-CM

## 2025-05-01 ENCOUNTER — HOSPITAL ENCOUNTER (OUTPATIENT)
Facility: HOSPITAL | Age: 55
Discharge: HOME | End: 2025-05-01
Admitting: NURSE PRACTITIONER
Payer: COMMERCIAL

## 2025-05-01 ENCOUNTER — TELEPHONE (OUTPATIENT)
Dept: CARDIOLOGY | Age: 55
End: 2025-05-01
Payer: COMMERCIAL

## 2025-05-01 DIAGNOSIS — I83.893 SYMPTOMATIC VARICOSE VEINS, BILATERAL: ICD-10-CM

## 2025-05-01 LAB
BH CV LEFT LOWER VAS AA GSV TRANS DIAMETER: 0.4 CM
BH CV LEFT LOWER VAS EXT ILIAC REFLUX COLOR FLOW TIME: 4.62 SEC
BH CV LEFT LOWER VAS GSV MID CALF TRANS DIAMETER: 0.1 CM
BH CV LEFT LOWER VAS MID FEMORAL REFLUX TIME: 3.87 SEC
BH CV LEFT LOWER VAS SAPHENOFEMORAL JUNCTION TRANSVERSE DIAMETER: 0.6 CM
BH CV LEFT LOWER VAS SSV MID CALF TRANS DIAMETER: 0.2 CM
BH CV LEFT LOWER VAS SSV PROX CALF TRANS DIAMETER: 0.2 CM
BH CV LEFT LOWER VAS VARICOSITY BK REFLUX TIME: 1.42 SEC
BH CV LEFT LOWER VAS VARICOSITY BK TRANS DIAMETER: 0.3 CM
BH CV LOW VAS LEFT EXTERNAL ILIAC AUGMENT: NORMAL
BH CV LOW VAS LEFT EXTERNAL ILIAC COMPRESS: NORMAL
BH CV LOWER VAS LEFT GSV MID CALF COMPRESSIBILTY: NORMAL
BH CV LOWER VASCULAR LEFT AA GSV COMPETENT: NORMAL
BH CV LOWER VASCULAR LEFT AA GSV COMPRESS: NORMAL
BH CV LOWER VASCULAR LEFT COMMON FEMORAL AUGMENT: NORMAL
BH CV LOWER VASCULAR LEFT COMMON FEMORAL COMPETENT: NORMAL
BH CV LOWER VASCULAR LEFT COMMON FEMORAL COMPRESS: NORMAL
BH CV LOWER VASCULAR LEFT COMMON FEMORAL PHASIC: NORMAL
BH CV LOWER VASCULAR LEFT COMMON FEMORAL SPONT: NORMAL
BH CV LOWER VASCULAR LEFT DISTAL FEMORAL COMPRESS: NORMAL
BH CV LOWER VASCULAR LEFT EXTERNAL ILIAC COMPETENT: NORMAL
BH CV LOWER VASCULAR LEFT EXTERNAL ILIAC PHASIC: NORMAL
BH CV LOWER VASCULAR LEFT EXTERNAL ILIAC SPONT: NORMAL
BH CV LOWER VASCULAR LEFT GASTRONEMIUS COMPRESS: NORMAL
BH CV LOWER VASCULAR LEFT LESSER SAPH COMPETENT: NORMAL
BH CV LOWER VASCULAR LEFT LESSER SAPH COMPRESS: NORMAL
BH CV LOWER VASCULAR LEFT MID FEMORAL AUGMENT: NORMAL
BH CV LOWER VASCULAR LEFT MID FEMORAL COMPETENT: NORMAL
BH CV LOWER VASCULAR LEFT MID FEMORAL COMPRESS: NORMAL
BH CV LOWER VASCULAR LEFT PERONEAL AUGMENT: NORMAL
BH CV LOWER VASCULAR LEFT PERONEAL COMPRESS: NORMAL
BH CV LOWER VASCULAR LEFT POPLITEAL AUGMENT: NORMAL
BH CV LOWER VASCULAR LEFT POPLITEAL COMPETENT: NORMAL
BH CV LOWER VASCULAR LEFT POPLITEAL COMPRESS: NORMAL
BH CV LOWER VASCULAR LEFT POSTERIOR TIBIAL AUGMENT: NORMAL
BH CV LOWER VASCULAR LEFT POSTERIOR TIBIAL COMPRESS: NORMAL
BH CV LOWER VASCULAR LEFT PROFUNDA FEMORAL COMPRESS: NORMAL
BH CV LOWER VASCULAR LEFT PROXIMAL FEMORAL COMPRESS: NORMAL
BH CV LOWER VASCULAR LEFT SAPHENOFEMORAL JUNCTION AUGMENT: NORMAL
BH CV LOWER VASCULAR LEFT SAPHENOFEMORAL JUNCTION COMPETENT: NORMAL
BH CV LOWER VASCULAR LEFT SAPHENOFEMORAL JUNCTION COMPRESS: NORMAL
BH CV LOWER VASCULAR LEFT SAPHENOFEMORAL JUNCTION PHASIC: NORMAL
BH CV LOWER VASCULAR LEFT SAPHENOFEMORAL JUNCTION SPONT: NORMAL
BH CV LOWER VASCULAR LEFT SOLEAL COMPRESS: NORMAL
BH CV LOWER VASCULAR LEFT SSV MID CALF COMPRESS: NORMAL
BH CV LOWER VASCULAR LEFT VARICOSITY BK COMPETENT: NORMAL
BH CV LOWER VASCULAR LEFT VARICOSITY BK COMPRESS: NORMAL
BH CV LOWER VASCULAR RIGHT COMMON FEMORAL AUGMENT: NORMAL
BH CV LOWER VASCULAR RIGHT COMMON FEMORAL COMPETENT: NORMAL
BH CV LOWER VASCULAR RIGHT COMMON FEMORAL COMPRESS: NORMAL
BH CV LOWER VASCULAR RIGHT COMMON FEMORAL PHASIC: NORMAL
BH CV LOWER VASCULAR RIGHT COMMON FEMORAL SPONT: NORMAL

## 2025-05-01 PROCEDURE — 93971 EXTREMITY STUDY: CPT

## 2025-05-01 NOTE — TELEPHONE ENCOUNTER
Notified patient that the labs are already ordered and can get done when she likes.    Patient verbalized understanding.    Ana Cristina

## 2025-05-01 NOTE — TELEPHONE ENCOUNTER
Caller: Rafia Baker    Relationship: Self    Best call back number: [phone]    What orders are you requesting (i.e. lab or imaging): LABS LIPID, APO PRO, PATIENT ISN'T SURE     In what timeframe would the patient need to come in: ASAP    Where [place] you [place]  [facility] [place]    Additional notes: PATIENT WAS SUPPOSED TO GET THESE LABS IN MARCH BUT DIDN'T PLEASE CALL AND ADVISE ONCE ORDERS ARE SENT.

## 2025-05-06 ENCOUNTER — TELEPHONE (OUTPATIENT)
Dept: INTERNAL MEDICINE | Facility: CLINIC | Age: 55
End: 2025-05-06

## 2025-05-06 DIAGNOSIS — Z00.00 WELLNESS EXAMINATION: Primary | ICD-10-CM

## 2025-05-06 DIAGNOSIS — Z11.59 NEED FOR HEPATITIS C SCREENING TEST: ICD-10-CM

## 2025-05-06 NOTE — TELEPHONE ENCOUNTER
PATIENT WANTED TO ADD THAT SHE WILL WANT TO GET THESE DONE TOMORROW. PLEASE CALL PATIENT ASAP TO LET HER KNOW WHEN THESE HAVE BEEN PLACED.

## 2025-05-06 NOTE — TELEPHONE ENCOUNTER
Caller: Rafia Baker    Relationship: Self    Best call back number: [phone]    What orders are you requesting (i.e. lab or imaging): LABS - DR. VASQUEZ ORDERED LIPID PANEL, CAN DR. ANGEL ORDER OTHER THINGS THAT ARE NORMALLY DONE AT ANNUAL PHYSICAL BESIDES THIS ?    In what timeframe would the patient need to come in: ASAP    Where will you receive your lab/imaging services: [facility]    Additional notes: PLEASE ADVISE, PATIENT NEVER HAD LABS DRAWN FOR PHYSICAL.

## 2025-05-07 ENCOUNTER — LAB (OUTPATIENT)
Dept: LAB | Facility: HOSPITAL | Age: 55
End: 2025-05-07
Payer: COMMERCIAL

## 2025-05-07 DIAGNOSIS — E78.41 ELEVATED LIPOPROTEIN(A): ICD-10-CM

## 2025-05-07 DIAGNOSIS — Z82.49 FAMILY HISTORY OF EARLY CAD: ICD-10-CM

## 2025-05-07 LAB
CHOLEST SERPL-MCNC: 165 MG/DL (ref 0–200)
HDLC SERPL-MCNC: 65 MG/DL (ref 40–60)
LDLC SERPL CALC-MCNC: 85 MG/DL (ref 0–100)
LDLC/HDLC SERPL: 1.3 {RATIO}
TRIGL SERPL-MCNC: 79 MG/DL (ref 0–150)
VLDLC SERPL-MCNC: 15 MG/DL (ref 5–40)

## 2025-05-07 PROCEDURE — 80061 LIPID PANEL: CPT

## 2025-05-07 PROCEDURE — 80050 GENERAL HEALTH PANEL: CPT | Performed by: STUDENT IN AN ORGANIZED HEALTH CARE EDUCATION/TRAINING PROGRAM

## 2025-05-07 PROCEDURE — 82172 ASSAY OF APOLIPOPROTEIN: CPT

## 2025-05-07 PROCEDURE — 83036 HEMOGLOBIN GLYCOSYLATED A1C: CPT | Performed by: STUDENT IN AN ORGANIZED HEALTH CARE EDUCATION/TRAINING PROGRAM

## 2025-05-07 PROCEDURE — 86803 HEPATITIS C AB TEST: CPT | Performed by: STUDENT IN AN ORGANIZED HEALTH CARE EDUCATION/TRAINING PROGRAM

## 2025-05-09 ENCOUNTER — TELEPHONE (OUTPATIENT)
Dept: CARDIOLOGY | Age: 55
End: 2025-05-09
Payer: COMMERCIAL

## 2025-05-09 LAB — APO B SERPL-MCNC: 82 MG/DL

## 2025-05-09 NOTE — TELEPHONE ENCOUNTER
I called Rafia Baker but there was no answer.  Left a voicemail asking for them to call back and ask to speak with triage.  Will continue to try to reach them.    HUB- if they call back, please transfer through to triage.    Sahara VIEYRA RN  Triage [facility]  05/09/25 09:06 EDT

## 2025-05-12 NOTE — TELEPHONE ENCOUNTER
I tried to call Rafia Baker but there was no answer.  I left a detailed message (okay per ANJEL form) relaying information from provider regarding normal test results.  I encouraged pt to call our office back if they have any questions.    Sahara VIEYRA RN  Triage [facility] [place]  05/12/25 09:04 EDT

## 2025-05-13 ENCOUNTER — OFFICE VISIT (OUTPATIENT)
Dept: CARDIOLOGY | Age: 55
End: 2025-05-13
Payer: COMMERCIAL

## 2025-05-13 VITALS
HEART RATE: 69 BPM | HEIGHT: 64 IN | DIASTOLIC BLOOD PRESSURE: 78 MMHG | WEIGHT: 154 LBS | OXYGEN SATURATION: 97 % | SYSTOLIC BLOOD PRESSURE: 124 MMHG | BODY MASS INDEX: 26.29 KG/M2

## 2025-05-13 DIAGNOSIS — Z82.49 FAMILY HISTORY OF EARLY CAD: Primary | ICD-10-CM

## 2025-05-13 DIAGNOSIS — E78.41 ELEVATED LIPOPROTEIN(A): ICD-10-CM

## 2025-05-13 DIAGNOSIS — I25.10 CORONARY ARTERY DISEASE INVOLVING NATIVE CORONARY ARTERY OF NATIVE HEART WITHOUT ANGINA PECTORIS: ICD-10-CM

## 2025-05-13 PROCEDURE — 99214 OFFICE O/P EST MOD 30 MIN: CPT | Performed by: INTERNAL MEDICINE

## 2025-05-13 PROCEDURE — 93000 ELECTROCARDIOGRAM COMPLETE: CPT | Performed by: INTERNAL MEDICINE

## 2025-05-13 NOTE — PROGRESS NOTES
Date of Office Visit: 2024  Encounter Provider: Stephani Fountain MD  Place of Service: [facility]  Patient Name: Rafia Baker  :1970      Patient ID:  Rafia Baker is a 54 y.o. female is here for hyperlipidemia, mild CAD.          History of Present Illness    She has a history of mild CAD, chronic venous insufficiency and hyperlipidemia.    Her mom  at 63 of myocardial infarction.  Her dad had hypertension and  in his sleep at age 80.  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  She is from [place].    Stress echocardiogram done 2019 was nonischemic with normal left ventricular systolic function no significant valvular abnormalities.      CTA heart done 2023 at [facility] showed a total calcium score 3.2 in the LAD, 30% mid LAD stenosis, 20% proximal mLAD stenosis, 30% mid circumflex stenosis, mild nonobstructive coronary disease-medical management recommended.    Labs on 2024 showed LP(a) 286, HDL 67, triglycerides 55, LDL 92, VLDL 11.  Labs in 2025 show AST 33 with otherwise normal CMP, normal hemoglobin A1c, normal TSH, total cholesterol 165, HDL 65, LDL 85, VLDL 15, triglycerides 79, apolipoprotein B 82, normal CBC.    Now that school is out, she is starting to exercise again.  She did not have her lipids dropped as much as she would like them to with study and rosuvastatin.  She has no exertional chest tightness or pressure.  She does have venous incompetence of the legs and is somewhat worried about that.  She has no orthopnea or PND.  She is taking her medications as directed but reluctant to go up on rosuvastatin.  She will be traveling to [place] this summer.    Past Medical History:   Diagnosis Date    Anesthesia     STATES SLOW TO WAKE UP AFTER SURGERY    Hyperlipidemia     PONV  (postoperative nausea and vomiting)          Past Surgical History:   Procedure Laterality Date    COLONOSCOPY N/A 12/13/2023    Procedure: COLONOSCOPY to cecum into terminal ileum;  Surgeon: Krupa Sebastian MD;  Location: [facility] ENDOSCOPY;  Service: General;  Laterality: N/A;  pre: screening  post: normal    ENDOVENOUS ABLATION SAPHENOUS VEIN W/ LASER      HYSTEROSCOPY W/ POLYPECTOMY      OTHER SURGICAL HISTORY      IVF       Current Outpatient Medications on File Prior to Visit   Medication Sig Dispense Refill    Ascorbic Acid (VITAMIN C PO) Take  by mouth.      ezetimibe (ZETIA) 10 MG tablet Take 1 tablet by mouth Daily. 90 tablet 3    fluticasone (FLONASE) 50 MCG/ACT nasal spray Administer 1 spray into the nostril(s) as directed by provider Daily. 16 g 0    magnesium gluconate (MAGONATE) 500 MG tablet Take 27 mg by mouth Daily.      rosuvastatin (CRESTOR) 10 MG tablet Take 1 tablet by mouth Every Night. 90 tablet 3    vitamin D (ERGOCALCIFEROL) 1.25 MG (90920 UT) capsule capsule Take 1 capsule by mouth 1 (One) Time Per Week.      [DISCONTINUED] olopatadine (PATADAY) 0.2 % solution ophthalmic solution Administer 1 drop to both eyes Daily. (Patient not taking: Reported on 5/13/2025) 2.5 mL 0     No current facility-administered medications on file prior to visit.       Social History     Socioeconomic History    Marital status:     Number of children: 1   Tobacco Use    Smoking status: Never     Passive exposure: Never    Smokeless tobacco: Never    Tobacco comments:     Caffeine: 2 coffee daily   Vaping Use    Vaping status: Never Used   Substance and Sexual Activity    Alcohol use: Not Currently     Comment: 1-2 times yearly    Drug use: Never    Sexual activity: Defer             Procedures    ECG 12 Lead    Date/Time: 5/13/2025 9:38 AM  Performed by: Stephani Fountain MD    Authorized by: Stephani Fountain MD  Comparison: compared with previous ECG   Similar to previous ECG  Rhythm: sinus  "rhythm  Other findings: non-specific ST-T wave changes    Clinical impression: abnormal EKG              Objective:      Vitals:    05/13/25 0920   BP: 124/78   BP Location: Left arm   Patient Position: Sitting   Cuff Size: Adult   Pulse: 69   SpO2: 97%   Weight: 69.9 kg (154 lb)   Height: 162.6 cm (64.02\")     Body mass index is 26.42 kg/m².    Vitals reviewed.   Constitutional:       General: Not in acute distress.     Appearance: Not diaphoretic.   Neck:      Vascular: No carotid bruit or JVD.   Pulmonary:      Effort: Pulmonary effort is normal.      Breath sounds: Normal breath sounds.   Cardiovascular:      Normal rate. Regular rhythm.      Murmurs: There is a grade 1/6 midsystolic murmur at the URSB and ULSB.      No gallop.  No rub.   Pulses:     Intact distal pulses.      Carotid: 2+ bilaterally.     Radial: 2+ bilaterally.     Dorsalis pedis: 2+ bilaterally.     Posterior tibial: 2+ bilaterally.  Edema:     Peripheral edema absent.   Neurological:      Cranial Nerves: No cranial nerve deficit.         Lab Review:       Assessment:      Diagnosis Plan   1. Family history of early CAD  Apolipoprotein B    Lipid Panel    Comprehensive Metabolic Panel      2. Elevated lipoprotein(a)  Apolipoprotein B    Lipid Panel    Comprehensive Metabolic Panel      3. Coronary artery disease involving native coronary artery of native heart without angina pectoris  Apolipoprotein B    Lipid Panel    Comprehensive Metabolic Panel          Elevated LP(a) and apolipoprotein B.  Remain on rosuvastatin 10 mg nightly.  Add Zetia.  Mild nonobstructive CAD, no angina  Family history of CAD  Mildly abnormal ECG, chronic.     Plan:       Remain on rosuvastatin 10 mg daily and add Zetia 10 mg daily.  Consider increasing rosuvastatin to 20 mg nightly if her lipids remain high, specifically LDL greater than 70.  Check lipids in 3 months and apolipoprotein B.  Her liver enzymes are slightly elevated-repeat in 3 months.  See me in 4 " months.

## 2025-05-16 ENCOUNTER — APPOINTMENT (OUTPATIENT)
Dept: WOMENS IMAGING | Facility: HOSPITAL | Age: 55
End: 2025-05-16
Payer: COMMERCIAL

## 2025-05-16 PROCEDURE — 77067 SCR MAMMO BI INCL CAD: CPT | Performed by: RADIOLOGY

## 2025-05-16 PROCEDURE — 77063 BREAST TOMOSYNTHESIS BI: CPT | Performed by: RADIOLOGY

## 2025-05-21 PROBLEM — I83.812 VARICOSE VEINS OF LEFT LOWER EXTREMITY WITH PAIN: Status: ACTIVE | Noted: 2025-05-21

## 2025-05-21 PROBLEM — M79.89 SWELLING OF LIMB: Status: ACTIVE | Noted: 2025-05-21

## 2025-05-22 ENCOUNTER — OFFICE VISIT (OUTPATIENT)
Age: 55
End: 2025-05-22
Payer: COMMERCIAL

## 2025-05-22 VITALS
HEIGHT: 64 IN | SYSTOLIC BLOOD PRESSURE: 120 MMHG | DIASTOLIC BLOOD PRESSURE: 68 MMHG | BODY MASS INDEX: 26.29 KG/M2 | WEIGHT: 154 LBS

## 2025-05-22 DIAGNOSIS — I83.812 VARICOSE VEINS OF LEFT LOWER EXTREMITY WITH PAIN: Primary | ICD-10-CM

## 2025-05-22 DIAGNOSIS — M79.89 SWELLING OF LIMB: ICD-10-CM

## 2025-05-22 NOTE — PATIENT INSTRUCTIONS
"Healthy Eating Suggestions    Healthy eating may help you get and keep a healthy body weight, reduce the risk of chronic illnesses, and live a longer and more productive life. It is important to follow a healthy eating pattern. Your nutritional and calorie needs should be met mainly by different nutrient-rich foods.  Here are some tips:  Reading food labels  Read labels and choose the following:  Reduced or low sodium products.  Juices with 100% fruit juice.  Foods with low saturated fats (<3 g per serving) and high polyunsaturated and monounsaturated fats.  Foods with whole grains, such as whole wheat, cracked wheat, brown rice, and wild rice.  Whole grains that are fortified with folic acid. This is recommended for females who are pregnant or who want to become pregnant.  Read labels and do not eat or drink the following:  Foods or drinks with added sugars. These include foods that contain brown sugar, corn sweetener, corn syrup, dextrose, fructose, glucose, high-fructose corn syrup, honey, invert sugar, lactose, malt syrup, maltose, molasses, raw sugar, sucrose, trehalose, or turbinado sugar.  Limit your intake of added sugars to less than 10% of your total daily calories. Do not eat more than the following amounts of added sugar per day:  6 teaspoons (25 g) for females.  9 teaspoons (38 g) for males.  Foods that contain processed or refined starches and grains.  Refined grain products, such as white flour, degermed cornmeal, white bread, and white rice.  Shopping  Choose nutrient-rich snacks, such as vegetables, whole fruits, and nuts. Avoid high-calorie and high-sugar snacks, such as potato chips, fruit snacks, and candy.  Use oil-based dressings and spreads on foods instead of solid fats such as butter, margarine, sour cream, or cream cheese.  Limit pre-made sauces, mixes, and \"instant\" products such as flavored rice, instant noodles, and ready-made pasta.  Try more plant-protein sources, such as tofu, black " beans, edamame, lentils, nuts, and seeds.  Explore eating plans such as the Mediterranean diet or vegetarian diet.  Try heart-healthy dips made with beans and healthy fats like hummus and guacamole. Vegetables go great with these.  Cooking  Use oil to sauté or stir-collier foods instead of solid fats such as butter, margarine, or lard.  Try baking, boiling, grilling, or broiling instead of frying.  Remove the fatty part of meats before cooking.  Steam vegetables in water or broth.  Meal planning    At meals, imagine dividing your plate into fourths:  One-half of your plate is fruits and vegetables.  One-fourth of your plate is whole grains.  One-fourth of your plate is protein, especially lean meats, poultry, eggs, tofu, beans, or nuts.  Include low-fat dairy as part of your daily diet.  Lifestyle  Choose healthy options in all settings, including home, work, school, restaurants, or stores.  Prepare your food safely:  Wash your hands after handling raw meats.  Where you prepare food, keep surfaces clean by regularly washing with hot, soapy water.  Keep raw meat separate from ready-to-eat foods, such as fruits and vegetables.  , meat, poultry, and eggs to the recommended temperature. Get a food thermometer.  Store foods at safe temperatures. In general:  Keep cold foods at 40°F (4.4°C) or below.  Keep hot foods at 140°F (60°C) or above.  Keep your freezer at 0°F (-17.8°C) or below.  Foods are not safe to eat if they have been between the temperatures of °F (4.4-60°C) for more than 2 hours.    What foods should I eat?  Fruits  Aim to eat 1½-2½ cups of fresh, canned (in natural juice), or frozen fruits each day. One cup of fruit equals 1 small apple, 1 large banana, 8 large strawberries, 1 cup (237 g) canned fruit, ½ cup (82 g) dried fruit, or 1 cup (240 mL) 100% juice.  Vegetables  Aim to eat 2-4 cups of fresh and frozen vegetables each day, including different varieties and colors. One cup of vegetables  equals 1 cup (91 g) broccoli or cauliflower florets, 2 medium carrots, 2 cups (150 g) raw, leafy greens, 1 large tomato, 1 large gimenez pepper, 1 large sweet potato, or 1 medium white potato.  Grains  Aim to eat 5-10 ounce-equivalents of whole grains each day. Examples of 1 ounce-equivalent of grains include 1 slice of bread, 1 cup (40 g) ready-to-eat cereal, 3 cups (24 g) popcorn, or ½ cup (93 g) cooked rice.  Meats and other proteins  Try to eat 5-7 ounce-equivalents of protein each day. Examples of 1 ounce-equivalent of protein include 1 egg, ½ oz nuts (12 almonds, 24 pistachios, or 7 walnut halves), 1/4 cup (90 g) cooked beans, 6 tablespoons (90 g) hummus or 1 tablespoon (16 g) peanut butter. A cut of meat or fish that is the size of a deck of cards is about 3-4 ounce-equivalents (85 g).  Of the protein you eat each week, try to have at least 8 sounce (227 g) of seafood. This is about 2 servings per week. This includes salmon, trout, herring, sardines, and anchovies.  Dairy  Aim to eat 3 cup-equivalents of fat-free or low-fat dairy each day. Examples of 1 cup-equivalent of dairy include 1 cup (240 mL) milk, 8 ounces (250 g) yogurt, 1½ ounces (44 g) natural cheese, or 1 cup (240 mL) fortified soy milk.  Fats and oils  Aim for about 5 teaspoons (21 g) of fats and oils per day. Choose monounsaturated fats, such as canola and olive oils, mayonnaise made with olive oil or avocado oil, avocados, peanut butter, and most nuts, or polyunsaturated fats, such as sunflower, corn, and soybean oils, walnuts, pine nuts, sesame seeds, sunflower seeds, and flaxseed.  Beverages  Aim for 6 eight-ounce glasses of water per day. Limit coffee to 3-5 eight-ounce cups per day.  Limit caffeinated beverages that have added calories, such as soda and energy drinks.  If you drink alcohol, limit how much you have to:  0-1 drink a day if you are female.  0-2 drinks a day if you are male.  Know how much alcohol is in your drink. In the [place]  one drink is one 12 oz bottle of beer (355 mL), one 5 oz glass of wine (148 mL), or one 1½ oz glass of hard liquor (44 mL).  Seasoning and other foods  Try not to add too much salt to your food. Try using herbs and spices instead of salt.  Try not to add sugar to food.  This information is based on [place] nutrition guidelines. To learn more, visit chooseOpVistaplate.gov. Exact amounts may vary. You may need different amounts.    Elsevier Patient Education © 2024 [employer]

## (undated) DEVICE — CANN O2 ETCO2 FITS ALL CONN CO2 SMPL A/ 7IN DISP LF

## (undated) DEVICE — KT ORCA ORCAPOD DISP STRL

## (undated) DEVICE — ADAPT CLN BIOGUARD AIR/H2O DISP

## (undated) DEVICE — SENSR O2 OXIMAX FNGR A/ 18IN NONSTR

## (undated) DEVICE — LN SMPL CO2 SHTRM SD STREAM W/M LUER

## (undated) DEVICE — TUBING, SUCTION, 1/4" X 10', STRAIGHT: Brand: MEDLINE